# Patient Record
(demographics unavailable — no encounter records)

---

## 2020-08-10 NOTE — XMS REPORT
[facility]

                             Created on: 2018



Judy Scott

External Reference #: [id number]

: 1976

Sex: Female



Demographics





                          Address                   [address] [place]  [address]

 

                          Preferred Language        Unknown

 

                          Marital Status            Unknown

 

                          Quaker Affiliation     Unknown

 

                          Race                      Unknown

 

                          Ethnic Group              Unknown





Author





                          Author                    Judy LORENZO

Y

 

                          Organization              [facility]

 

                          Address                   [address] [place]  [address]



 

                          Phone                     [phone]







Care Team Providers





                    Care Team Member Name Role                Phone

 

                    SKY LORENZO Unavailable         [phone]







PROBLEMS





          Type      Condition ICD9-CM Code TFT82-YM Code Onset Dates Condition S

tatus SNOMED 

Code

 

          Problem   HGSIL on cytologic smear of cervix           R87.613        

     Active    321580731

 

          Problem   HPV (human papilloma virus) infection           A63.0       

        Active    333277093

 

          Problem   Carcinoma in situ of endocervix           D06.0             

  Active    00304839







ALLERGIES

No Known Allergies



ENCOUNTERS





                Encounter       Location        Date            Diagnosis

 

                          [facility]   [address] [facility] [id number]

[address] [place] [id number]

                          13 Sep, 2018               

 

                          [facility]     [address] [id number]

[address] [place] [phone]

                                        Well woman exam Z01.419 and 

Screening breast examination Z12.31

 

                          [facility]     [address] [id number]

[address] [place] [phone]

                                         

 

                          [facility]     [address] [id number]

[address] [place] [phone]

                          09 May, 2017               

 

                          [facility]     [address] [id number]

[address] [place] [phone]

                          03 May, 2017               

 

                          [facility]     [address] [facility] [id number]

[address] [place] [phone]

                                        HGSIL on cytologic smear of 

cervix R87.613

 

                          [facility]     [address] [facility] [id number]

[address] [place] [address]

                                        HGSIL on cytologic smear of 

cervix R87.613 and HPV (human papilloma

virus) infection A63.0

 

                          [place] [address] [facility] [id number]

[address] [place] [phone]

                          27 Dec, 2016              Cervical cancer screening Z1

2.4

 

                          [facility]     [address] [facility] [id number]

[address] [place] [address]

                          16 2016              Well woman exam Z01.419 and 

Palpitations R00.2

 

                          [facility]     [address] [facility] [id number]

[address] [place] [phone]

                          30 Mar, 2016              Dental examination Z01.20







IMMUNIZATIONS

No Known Immunizations



SOCIAL HISTORY

Never Assessed



REASON FOR VISIT

pap /breast exam  -- lisandra bose



PLAN OF CARE





                          Activity                  Details

 

                                         

 

                          Follow Up                 prn Reason:







VITAL SIGNS





                    Height              5'0" in             2018

 

                    Weight              168.0 lbs           2018

 

                    Temperature         98.0 degrees Fahrenheit 2018

 

                    Heart Rate          70 bpm              2018

 

                    Respiratory Rate    20                  2018

 

                    BMI                 32.81 kg/m2         2018

 

                    Blood pressure systolic 120 mmHg            2018

 

                    Blood pressure diastolic 76 mmHg             2018







MEDICATIONS

Unknown Medications



RESULTS

No Results



PROCEDURES





                Procedure       Date Ordered    Result          Body Site

 

                SPECIMEN HANDLING 2018                    







INSTRUCTIONS





MEDICATIONS ADMINISTERED

No Known Medications



MEDICAL (GENERAL) HISTORY





                    Type                Description         Date

 

                    Surgical History     x 2        

 

                    Surgical History    tubal ligation

## 2020-08-10 NOTE — DIAGNOSTIC IMAGING REPORT
INDICATION: 

Shortness of breath.



EXAMINATION:

PA and lateral chest views.



FINDINGS:

The lungs are well-aerated and clear. The heart is not enlarged.

No pulmonary edema or hilar adenopathy. No pneumothorax or

pleural effusion. No bony abnormalities.



IMPRESSION: 

Negative PA and lateral chest.



Dictated by: 



  Dictated on workstation # [id number]

## 2020-08-10 NOTE — XMS REPORT
Continuity of Care Document

                             Created on: 08/10/2020



WILBURNJEIMY ELLIOTTA

External Reference #: [id number]

: 1976

Sex: Female



Demographics





                          Home Phone                [phone]

 

                          Preferred Language        Unknown

 

                          Marital Status            Unknown

 

                          Protestant Affiliation     Unknown

 

                          Race                      Unknown

 

                          Ethnic Group              Unknown





Author





                          Organization              Unknown

 

                          Address                   Unknown

 

                          Phone                     Unavailable



              



Allergies

      



There is no data.                  



Medications

      



There is no data.                  



Problems

      



There is no data.                  



Procedures

      



There is no data.                  



Results

      



                    Test                Result              Range        

 

                                        CBC With Differential/Platelet - 

6 17:06         

 

                    WBC                 11.3 x10E3/uL           3.4-10.8        

 

                    RBC                 4.80 x10E6/uL           3.77-5.28       

 

 

                    Hemoglobin           13.9 g/dL           11.1-15.9        

 

                    Hematocrit           39.9 %              34.0-46.6        

 

                    MCV                 83 fL               79-97        

 

                    MCH                 29.0 pg             26.6-33.0        

 

                    MCHC                34.8 g/dL           31.5-35.7        

 

                    RDW                 13.5 %              12.3-15.4        

 

                    Platelets           327 x10E3/uL           150-379        

 

                    Neutrophils           73 %                         

 

                    Lymphs              22 %                         

 

                    Monocytes           5 %                          

 

                    Eos                 0 %                          

 

                    Basos               0 %                          

 

                    Neutrophils (Absolute)           8.2 x10E3/uL           1.4-

7.0        

 

                    Lymphs (Absolute)           2.4 x10E3/uL           0.7-3.1  

      

 

                    Monocytes(Absolute)           0.6 x10E3/uL           0.1-0.9

        

 

                    Eos (Absolute)           0.0 x10E3/uL           0.0-0.4     

   

 

                    Baso (Absolute)           0.0 x10E3/uL           0.0-0.2    

    

 

                    Immature Granulocytes           0 %                         

 

 

                    Immature Grans (Abs)           0.0 x10E3/uL           0.0-0.

1        

 

                                        Comp. Metabolic Panel (14) - 16 17

:06         

 

                    Glucose, Serum           97 mg/dL            65-99        

 

                    BUN                 11 mg/dL            6-24        

 

                    Creatinine, Serum           0.72 mg/dL           0.57-1.00  

      

 

                    eGFR If NonAfricn Am           105 mL/min/1.73              

 >59        

 

                    eGFR If Africn Am           121 mL/min/1.73               >5

9        

 

                    BUN/Creatinine Ratio           15                  9-23     

   

 

                    Sodium, Serum           142 mmol/L           136-144        

 

                    Potassium, Serum           4.7 mmol/L           3.5-5.2     

   

 

                    Chloride, Serum           104 mmol/L                  

 

 

                    Carbon Dioxide, Total           26 mmol/L           18-29   

     

 

                    Calcium, Serum           9.5 mg/dL           8.7-10.2       

 

 

                    Protein, Total, Serum           7.1 g/dL            6.0-8.5 

       

 

                    Albumin, Serum           4.3 g/dL            3.5-5.5        

 

                    Globulin, Total           2.8 g/dL            1.5-4.5       

 

 

                    A/G Ratio           1.5                 1.1-2.5        

 

                    Bilirubin, Total           0.5 mg/dL           0.0-1.2      

  

 

                    Alkaline Phosphatase, S           87 IU/L             

        

 

                    AST (SGOT)           20 IU/L             0-40        

 

                    ALT (SGPT)           18 IU/L             0-32        

 

                                        Thyroid [place] Profile - 16 17:06

         

 

                    TSH                 2.110 uIU/mL           0.450-4.500      

  

 

                                        Pap Lb, HPV-hr - 16 13:55         

 

                    HPV, high-risk           Positive            Negative       

 

 

                    DIAGNOSIS:           Comment                      

 

                    Recommendation:           Comment                      

 

                    Specimen adequacy:           Comment                      

 

                    Clinician provided ICD10:           Comment                 

     

 

                    Performed by:           Comment                      

 

                    Electronically signed by:           Comment                 

     

 

                    .                   .                            

 

                    Pathologist provided ICD10:           Comment               

       

 

                    Note:               Comment                      

 

                                        Pathology Report - 17 14:22       

  

 

                    .                   Comment                      

 

                    .                   Comment                      

 

                    .                   Comment                      

 

                    .                   Comment:                     

 

                    .                   Comment                      

 

                    .                   Comment                      

 

                    .                   Comment                      

 

                    .                   Comment                      

 

                    .                   Comment                      

 

                                        Pathology Report - 17 09:40       

  

 

                    .                   Comment                      

 

                    .                   Comment                      

 

                    .                   Comment                      

 

                    .                   Comment                      

 

                    .                   Comment                      

 

                    .                   Comment                      

 

                    .                   Comment                      

 

                                        SUREPATH PAP AND HPV mRNA E6/E7 -  14:32         

 

                    CLINICAL INFORMATION:                               NRG     

   

 

                    LMP:                HY                  NRG        

 

                    PREV. PAP:                               NRG        

 

                    PREV. BX:                               NRG        

 

                    SOURCE:                                 NRG        

 

                    STATEMENT OF ADEQUACY:                               NRG    

    

 

                    INTERPRETATION/RESULT:                               NRG    

    

 

                    CYTOTECHNOLOGIST:                               NRG        

 

                    HPV mRNA E6/E7, SUREPATH VIAL           Not Detected        

    NOT DETECTED    

   

 

                    COMMENT                                 NRG        

 

                                        SUREPATH PAP AND HPV mRNA E6/E7 -  13:43         

 

                    CLINICAL INFORMATION:                               NRG     

   

 

                    LMP:                                    NRG        

 

                    PREV. PAP:                               NRG        

 

                    PREV. BX:                               NRG        

 

                    SOURCE:                                 NRG        

 

                    STATEMENT OF ADEQUACY:                               NRG    

    

 

                    INTERPRETATION/RESULT:                               NRG    

    

 

                    CYTOTECHNOLOGIST:                               NRG        

 

                    HPV mRNA E6/E7, SUREPATH VIAL           Not Detected        

    NOT DETECTED    

   

 

                    INFECTION:                               NRG        

 

                    COMMENT                                 NRG        



                              



Encounters

      



                ACCT No.           Visit Date/Time           Discharge          

 Status         

             Pt. Type           Provider           Facility           Loc./Unit 

          

Complaint        

 

             [id number]           2017 10:06:00                         

            

Document Registration                                                           

         

 

             [id number]           2017 18:08:00                         

            

Document Registration                                                           

         

 

             [id number]           08/10/2020 13:00:00                        [facility]    

       

Outpatient           MARGARITO DOLAN LISSET                               [facility]

 [facility]

[facility]ARE                                             

 

             [id number]           2019 13:00:00                              

       Document

Registration                                                                    

 

             [id number]           2018 13:00:00                              

       Document

Registration                                                                    

 

             [id number]           2016 13:06:00                         

            

Document Registration                                                           

         

 

             [id number]           2017 13:06:00                         

            

Document Registration

## 2020-08-10 NOTE — XMS REPORT
[facility]

                             Created on: 10/26/2017



Judy Scott

External Reference #: [id number]

: 1976

Sex: Female



Demographics





                          Address                   [address] [place]  [address]

 

                          Preferred Language        Unknown

 

                          Marital Status            Unknown

 

                          Jewish Affiliation     Unknown

 

                          Race                      Unknown

 

                          Ethnic Group              Unknown





Author





                          Author                    Judy LIRA

 

                          [facility]

 

                          Address                   [address] [place]  [address]



 

                          Phone                     [phone]







Care Team Providers





                    Care Team Member Name Role                Phone

 

                    MEGAN LIRA     Unavailable         [phone]







PROBLEMS





          Type      Condition ICD9-CM Code WSI30-LH Code Onset Dates Condition S

tatus SNOMED 

Code

 

          Problem   HGSIL on cytologic smear of cervix           R87.613        

     Active    489453192

 

          Problem   HPV (human papilloma virus) infection           A63.0       

        Active    370051361

 

          Problem   Carcinoma in situ of endocervix           D06.0             

  Active    39476402







ALLERGIES

No Information



SOCIAL HISTORY

Never Assessed



PLAN OF CARE





VITAL SIGNS





MEDICATIONS

Unknown Medications



RESULTS

No Results



PROCEDURES

No Known procedures



IMMUNIZATIONS

No Known Immunizations



MEDICAL (GENERAL) HISTORY





                    Type                Description         Date

 

                    Surgical History     x 2        

 

                    Surgical History    tubal ligation

## 2020-08-10 NOTE — XMS REPORT
[facility]

                             Created on: 2017



Scott Judy

External Reference #: [id number]

: 1976

Sex: Female



Demographics





                          Address                   [address] [place]  [address]

 

                          Preferred Language        Unknown

 

                          Marital Status            Unknown

 

                          Rastafari Affiliation     Unknown

 

                          Race                      Unknown

 

                          Ethnic Group              Unknown





Author





                          Author                    Judy NGUYEN

 

                          [facility]

 

                          Address                   [address] [place]  [address]



 

                          Phone                     [phone]







Care Team Providers





                    Care Team Member Name Role                Phone

 

                    ELI NGUYEN       Unavailable         [phone]







PROBLEMS





          Type      Condition ICD9-CM Code PFF33-VV Code Onset Dates Condition S

tatus SNOMED 

Code

 

          Problem   HGSIL on cytologic smear of cervix           R87.613        

     Active    784821575

 

          Problem   HPV (human papilloma virus) infection           A63.0       

        Active    131000453

 

          Problem   Carcinoma in situ of endocervix           D06.0             

  Active    40514098







ALLERGIES

No Known Allergies



SOCIAL HISTORY

Never Assessed



PLAN OF CARE





                          Activity                  Details

 

                                         

 

                          Follow Up                 1 Year for well woman with p

ap Reason:







VITAL SIGNS





                    Height              5'0" in             2017

 

                    Weight              155.8 lbs           2017

 

                    Temperature         99.3 degrees Fahrenheit 2017

 

                    Heart Rate          82 bpm              2017

 

                    Respiratory Rate    20                  2017

 

                    BMI                 30.42 kg/m2         2017

 

                    Blood pressure systolic 114 mmHg            2017

 

                    Blood pressure diastolic 82 mmHg             2017







MEDICATIONS

Unknown Medications



RESULTS





                Name            Result          Date            Reference Range

 

                PREGNANCY TEST, URINE (IN HOUSE)                 2017     

  

 

                RESULTS         negative                         

 

                Lot #           [id number]                          

 

                Control         +                                

 

                Exp date        2018                          

 

                PDF Report                      2017       

 

                PDF Report1     [facility]                             

 

                PATHOLOGY REPORT                 2017       

 

                .                                                

 

                .                                                

 

                .                                                

 

                .               Comment:                         

 

                .                                                

 

                .                                                

 

                .                                                

 

                .                                                

 

                .                                                







PROCEDURES





                Procedure       Date Ordered    Result          Body Site

 

                URINE PREGNANCY TEST 2017                     

 

                ENDOCERV CURETTAGE W/SCOPE 2017                     







IMMUNIZATIONS

No Known Immunizations



MEDICAL (GENERAL) HISTORY





                    Type                Description         Date

 

                    Surgical History     x 2        

 

                    Surgical History    tubal ligation

## 2020-08-10 NOTE — XMS REPORT
[facility]

                             Created on: 2017



Tyler Judy

External Reference #: [id number]

: 1976

Sex: Female



Demographics





                          Address                   [address] [place]  [address]

 

                          Preferred Language        Unknown

 

                          Marital Status            Unknown

 

                          Latter-day Affiliation     Unknown

 

                          Race                      Unknown

 

                          Ethnic Group              Unknown





Author





                          Author                    Judy NGUYEN

 

                          Organization              [facility]

 

                          Address                   [address] [place]  [address]



 

                          Phone                     [phone]







Care Team Providers





                    Care Team Member Name Role                Phone

 

                    ELI NGUYEN       Unavailable         [phone]







PROBLEMS





          Type      Condition ICD9-CM Code CCB26-EL Code Onset Dates Condition S

tatus SNOMED 

Code

 

          Problem   HPV (human papilloma virus) infection           A63.0       

        Active    853055399

 

          Problem   HGSIL on cytologic smear of cervix           R87.613        

     Active    806725461

 

          Problem   Carcinoma in situ of endocervix           D06.0             

  Active    [id number]







ALLERGIES





             Substance    Reaction     Event Type   Date         Status

 

             N.K.D.A.     Unknown      Non Drug Allergy 27 Dec, 2016 Unknown







SOCIAL HISTORY

No smoking Hx information available



PLAN OF CARE





                          Activity                  Details

 

                                         

 

                          Follow Up                 1 Year with Nathaniel for well w

shameka Reason:







VITAL SIGNS





                    Height              5'0" in             2016

 

                    Weight              152.0 lbs           2016

 

                    Temperature         98.0 degrees Fahrenheit 2016

 

                    Heart Rate          78 bpm              2016

 

                    Respiratory Rate    18                  2016

 

                    BMI                 29.68 kg/m2         2016

 

                    Blood pressure systolic 118 mmHg            2016

 

                    Blood pressure diastolic 70 mmHg             2016







MEDICATIONS

Unknown Medications



RESULTS





                Name            Result          Date            Reference Range

 

                PDF Report                      2016       

 

                PDF Report1     [facility]                             

 

                PAP TEST W/ HPV REGARDLESS                 2016       

 

                DIAGNOSIS:                                       

 

                Recommendation:                                  

 

                Specimen adequacy:                                  

 

                Clinician provided ICD10:                                  

 

                Performed by:                                    

 

                Electronically signed by:                                  

 

                .               .                                

 

                Pathologist provided ICD10:                                  

 

                Note:                                            

 

                HPV, high-risk  Positive                        Negative







PROCEDURES





                Procedure       Date Ordered    Related Diagnosis Body Site

 

                SPECIMEN HANDLING Dec 27, 2016                     

 

                Office Visit, Est Pt., Level 3 Dec 27, 2016                     







IMMUNIZATIONS

No Known Immunizations

## 2020-08-10 NOTE — ED CARDIAC GENERAL
History of Present Illness


General


Stated Complaint:  A-FIB;SOA


Source:  patient


Exam Limitations:  language barrier ([place]   utilized)





History of Present Illness


Date Seen by Provider:  Aug 10, 2020


Time Seen by Provider:  15:04


Initial Comments


44-year-old female sent in by [facility] for further evaluation. Patient is 

sent in with new onset atrial fib. Patient has had palpitations and shortness of

breath for approximately one week. Patient was known COVID a positive 

approximately one month ago. Patient denies any chest pain but more of just a 

rapid heart rate. He denies any nausea or vomiting. There is no reports of cough

fevers chills. Patient describes what sounds like prior episodes of atrial fib 

but is difficult to tell due to the . Patient's history of present 

illness and review of systems is limited due to her  and language 

barrier





Allergies and Home Medications


Allergies


Coded Allergies:  


     No Known Drug Allergies (Unverified , 17)





Home Medications


Docusate Sodium 100 Mg Capsule, 100 MG PO BID PRN for CONSTIPATION-1ST LINE


   Prescribed by: SONIA MEDINA on 17 0932


Hydrocodone Bit/Acetaminophen 1 Each Tablet, 1-2 EA PO Q6H PRN for Pain-See 

Instructions


   Prescribed by: SONIA MEDINA on 17 0932


Ibuprofen 600 Mg Tablet, 600 MG PO Q6H PRN for PAIN-MODERATE


   Prescribed by: SONIA MEDINA on 17 0932


Simethicone 80 Mg Tab.chew, 40 MG PO TID PRN for INDIGESTION 2ND LINE


   Prescribed by: SONIA MEDINA on 17 0932





Patient Home Medication List


Home Medication List Reviewed:  Yes





Review of Systems


Review of Systems


Constitutional:  No chills, No dizziness, No fever, No weakness


Respiratory:  Denies Cough; Shortness of Air, SOA With Exertion; Denies Wheezing


Cardiovascular:  Denies Chest Pain; Irregular Heart Rate, Palpitations


Gastrointestinal:  No Symptoms Reported


Genitourinary:  No Symptoms Reported


Musculoskeletal:  no symptoms reported


Skin:  no symptoms reported


Psychiatric/Neurological:  No Symptoms Reported


Endocrine:  No Symptoms Reported


Hematologic/Lymphatic:  No Symptoms Reported





Past Medical-Social-Family Hx


Patient Social History


Recent Hopitalizations:  No





Immunizations Up To Date


Tetanus Booster (TDap):  Unknown





Seasonal Allergies


Seasonal Allergies:  Yes





Past Medical History


 Section, Tubal Ligation


Reproductive Disorders:  Yes (CIS CERVIX)


Female Reproductive Disorders:  Denies


Sexually Transmitted Disease:  No


HIV/AIDS:  No


Loss of Vision:  Denies


Hearing Impairment:  Denies


Adverse Reaction/Blood Tranf:  No





Physical Exam


Vital Signs





Vital Signs - First Documented








 8/10/20





 14:45


 


Temp 36.6


 


Pulse 92


 


Resp 15


 


B/P (MAP) 119/96 (104)


 


Pulse Ox 100


 


O2 Delivery Room Air





Capillary Refill :


Height, Weight, BMI


Height: 5'0.00"


Weight: 161lbs. 0.0oz. 73.668934vx; 31.4 BMI


Method:


General Appearance:  No Apparent Distress, WD/WN


Respiratory:  Lungs Clear, Normal Breath Sounds


Cardiovascular:  Irregularly Irregular, Tachycardia


Gastrointestinal:  Non Tender, Soft


Extremity:  Normal Capillary Refill, Normal Inspection, Normal Range of Motion


Neurologic/Psychiatric:  Alert, Oriented x3, No Motor/Sensory Deficits, CNs II-

XII Norm as Tested


Skin:  Normal Color, Warm/Dry





Progress/Results/Core Measures


Results/Orders


Lab Results





Laboratory Tests








Test


 8/10/20


15:05 Range/Units


 


 


White Blood Count


 10.4 


 4.3-11.0


10^3/uL


 


Red Blood Count


 5.41 


 4.35-5.85


10^6/uL


 


Hemoglobin 15.1  11.5-16.0  G/DL


 


Hematocrit 43  35-52  %


 


Mean Corpuscular Volume 80  80-99  FL


 


Mean Corpuscular Hemoglobin 28  25-34  PG


 


Mean Corpuscular Hemoglobin


Concent 35 


 32-36  G/DL





 


Red Cell Distribution Width 13.4  10.0-14.5  %


 


Platelet Count


 361 


 130-400


10^3/uL


 


Mean Platelet Volume 10.7 H 7.4-10.4  FL


 


Neutrophils (%) (Auto) 65  42-75  %


 


Lymphocytes (%) (Auto) 26  12-44  %


 


Monocytes (%) (Auto) 9  0-12  %


 


Eosinophils (%) (Auto) 0  0-10  %


 


Basophils (%) (Auto) 0  0-10  %


 


Neutrophils # (Auto) 6.7  1.8-7.8  X 10^3


 


Lymphocytes # (Auto) 2.7  1.0-4.0  X 10^3


 


Monocytes # (Auto) 0.9  0.0-1.0  X 10^3


 


Eosinophils # (Auto)


 0.0 


 0.0-0.3


10^3/uL


 


Basophils # (Auto)


 0.0 


 0.0-0.1


10^3/uL


 


Sodium Level 138  135-145  MMOL/L


 


Potassium Level 3.7  3.6-5.0  MMOL/L


 


Chloride Level 109 H   MMOL/L


 


Carbon Dioxide Level 21  21-32  MMOL/L


 


Anion Gap 8  5-14  MMOL/L


 


Blood Urea Nitrogen 14  7-18  MG/DL


 


Creatinine


 0.72 


 0.60-1.30


MG/DL


 


Estimat Glomerular Filtration


Rate > 60 


  





 


BUN/Creatinine Ratio 19   


 


Glucose Level 93    MG/DL


 


Calcium Level 9.2  8.5-10.1  MG/DL


 


Corrected Calcium 9.2  8.5-10.1  MG/DL


 


Magnesium Level 2.1  1.6-2.4  MG/DL


 


Total Bilirubin 1.0  0.1-1.0  MG/DL


 


Aspartate Amino Transf


(AST/SGOT) 22 


 5-34  U/L





 


Alanine Aminotransferase


(ALT/SGPT) 21 


 0-55  U/L





 


Alkaline Phosphatase 75    U/L


 


Troponin I < 0.028  <0.028  NG/ML


 


Total Protein 7.3  6.4-8.2  GM/DL


 


Albumin 4.0  3.2-4.5  GM/DL


 


TSH Cascade Testing


 1.75 


 0.35-4.94


UIU/ML








My Orders





Orders - EMANUEL,CLAUDINE L DO


Cbc With Automated Diff (8/10/20 15:05)


Comprehensive Metabolic Panel (8/10/20 15:05)


Magnesium (8/10/20 15:05)


Thyroid Analyzer (8/10/20 15:05)


Troponin I (8/10/20 15:05)


Chest Pa/Lat (2 View) (8/10/20 15:05)


Metoprolol Tartrate (Ir) Tab (Lopressor (8/10/20 15:45)


Ekg Tracing (8/10/20 16:08)





Vital Signs/I&O











 8/10/20 8/10/20





 14:45 16:45


 


Temp 36.6 36.6


 


Pulse 92 66


 


Resp 15 15


 


B/P (MAP) 119/96 (104) 109/82 (104)


 


Pulse Ox 100 100


 


O2 Delivery Room Air Room Air











Progress


Progress Note :  


   Time:  16:26


Progress Note


Patient spontaneously converted and sinus rhythm. Based on limited ability to 

visit with patient due to the  it sounds as if this is been happening

for at least a year. I called and discussed with Jerry at [facility] who 

has the same understanding. At this time I will have her start an aspirin a day.

Jerry will put in for a cardiology consult with Dr. Corcoran. Patient is stable 

and will be discharged home


Initial ECG Impression Date:  Aug 10, 2020


Initial ECG Impression Time:  15:02


Initial ECG Rate:  103


Initial ECG Rhythm:  A Fib/Flutter


Initial ECG Impression:  Atrial Fibrillation


Initial ECG Comparisson:  Changed


EKG :  


   EKG Time:  16:12


   Rate:  70


   Rhythm:  Normal Sinus





Diagnostic Imaging





   Diagonstic Imaging:  Xray


   Plain Films/CT/US/NM/MRI:  chest


Comments


                 [facility] [place], [place]





NAME:   COSMO VÁZQUEZKEANU L


[facility] REC#:   [id number]


ACCOUNT#:   [id number]


PT STATUS:   REG ER


:   1976


PHYSICIAN:   CLAUDINE EMANUEL DO


ADMIT DATE:   08/10/20/ER


                                   ***Draft***


Date of Exam:08/10/20





CHEST PA/LAT (2 VIEW)








INDICATION: 


Shortness of breath.





EXAMINATION:


PA and lateral chest views.





FINDINGS:


The lungs are well-aerated and clear. The heart is not enlarged.


No pulmonary edema or hilar adenopathy. No pneumothorax or


pleural effusion. No bony abnormalities.





IMPRESSION: 


Negative PA and lateral chest.





Departure


Impression





   Primary Impression:  


   Paroxysmal atrial fibrillation


Disposition:  01 HOME, SELF-CARE


Condition:  Stable





Departure-Patient Inst.


Referrals:  


NO,LOCAL PHYSICIAN (PCP)


Primary Care Physician


Patient Instructions:  Atrial Fibrillation (DC)





Add. Discharge Instructions:  


Please start an aspirin daily


Follow-up with the [facility]











CLAUDINE EMANUEL DO               Aug 10, 2020 15:05

## 2020-08-18 NOTE — STRESS TEST
DATE OF SERVICE:  08/18/2020



RESTING AND POST REGADENOSON TECHNETIUM-99M TETROFOSMIN SPECT CT IMAGING



ORDERING PHYSICIAN:

GEM Vidal.



CLINICAL DIAGNOSES:

Palpitations, chest discomfort, shortness of breath.



Baseline images were carried out after injection of 9.52 mCi of technetium-99m

Tetrofosmin.  This was followed by 0.4 mg Regadenoson and 30.5 mCi of

technetium-99m Tetrofosmin for stress imaging.  The electrocardiogram showed

sinus rhythm at baseline.  The electrocardiogram did not change significantly

with the Regadenoson infusion.  The patient reported flushing and feeling of

mild chest discomfort, which resolved in a few minutes.  Review of images at

rest and following stress does not indicate any significant perfusion defects

consistent with myocardial ischemia or infarction.  Gated images show normal

global left ventricular systolic function with normal regional wall motion. 

Left ventricular ejection fraction is calculated to be 70%.  Left ventricular

end diastolic volume is 61 mL.  TID is absent (1.07).



CONCLUSIONS:

1.  No evidence of any significant myocardial ischemia or infarction on this

study.

2.  Normal regional wall motion.

3.  Normal global left ventricular systolic function with a calculated ejection

fraction of 70%.





Job ID: [id number]

DocumentID: [id number]

Dictated Date:  08/18/2020 11:00:37

Transcription Date: 08/18/2020 11:53:11

Dictated By: GHAZALA CARDOZO MD, MA, FACP, FACC,

## 2022-09-21 NOTE — STRESS TEST
DATE OF SERVICE:  09/20/2022



RESTING AND POST EXERCISE TECHNETIUM-99M TETROFOSMIN SPECT CT IMAGING



ORDERING PHYSICIAN:

Dr. Wilson.



CLINICAL DIAGNOSIS:

Palpitations.



Baseline images were carried out after injection of 9.93 mCi of technetium-99m

Tetrofosmin.  Subsequently, exercise was carried out on a treadmill.  Dipak

protocol was employed.  After the patient had attained 85% of maximum predicted

heart rate, 29.5 mCi of technetium-99m Tetrofosmin were injected and the

exercise was continued for about another minute.  There was approximately 1 mm

upsloping ST segment depression at peak exercise and in the recovery phase.  The

patient did not report chest pain.  Test was stopped on account of fatigue.  The

patient attained 88% of maximum predicted heart rate and exercised for a total

of 9 minutes in the Dipak protocol.  Review of images at rest and following

stress does not indicate any significant perfusion defects consistent with

myocardial ischemia or infarction.  Gated images show normal global left

ventricular systolic function with normal regional wall motion.  Left

ventricular ejection fraction is calculated to be 64%.



CONCLUSIONS:

1.  No evidence of any significant myocardial ischemia or infarction on this

study.

2.  Normal regional wall motion.

3.  Normal global left ventricular systolic function with a calculated ejection

fraction of 64%.





Job ID: [id number]

DocumentID: [id number]

Dictated Date:  09/21/2022 12:37:02

Transcription Date: 09/21/2022 15:00:15

Dictated By: GHAZALA WILSON MD, MA, FACP, FACC,